# Patient Record
Sex: FEMALE | Race: WHITE
[De-identification: names, ages, dates, MRNs, and addresses within clinical notes are randomized per-mention and may not be internally consistent; named-entity substitution may affect disease eponyms.]

---

## 2018-07-19 ENCOUNTER — HOSPITAL ENCOUNTER (OUTPATIENT)
Dept: HOSPITAL 11 - JP.SDS | Age: 63
Discharge: HOME | End: 2018-07-19
Attending: SURGERY
Payer: MEDICARE

## 2018-07-19 VITALS — DIASTOLIC BLOOD PRESSURE: 91 MMHG | SYSTOLIC BLOOD PRESSURE: 151 MMHG

## 2018-07-19 DIAGNOSIS — F32.9: ICD-10-CM

## 2018-07-19 DIAGNOSIS — E78.70: Primary | ICD-10-CM

## 2018-07-19 DIAGNOSIS — Z98.84: ICD-10-CM

## 2018-07-19 DIAGNOSIS — Z88.1: ICD-10-CM

## 2018-07-19 DIAGNOSIS — Z88.2: ICD-10-CM

## 2018-07-19 PROCEDURE — 87081 CULTURE SCREEN ONLY: CPT

## 2018-07-19 PROCEDURE — 43239 EGD BIOPSY SINGLE/MULTIPLE: CPT

## 2018-07-23 NOTE — OR
DATE OF PROCEDURE:  07/19/2018

 

PREOPERATIVE DIAGNOSIS:  Bile reflux Francis limb and pouch suggestive of partial small bowel

obstruction.

 

POSTOPERATIVE DIAGNOSIS:  Bile reflux Francis limb and pouch suggestive of partial small bowel

obstruction.

 

PROCEDURE PERFORMED:  Upper GI endoscopy with biopsies of gastric pouch for CLOtest.

 

ANESTHESIA:  IV sedation.

 

INDICATION FOR PROCEDURE:  A 63-year-old presenting with long-term nausea status post Francis-

en-Y gastric bypass with some worsening epigastric discomfort as well as some reflux

symptoms.  The plan is to proceed with upper GI endoscopy with biopsies and/or dilation as

indicated.  Potential risks including bleeding and perforation were discussed, and the

patient wishes to proceed.

 

DETAILS OF PROCEDURE:  The patient was taken to the operating room and placed in a left

lateral decubitus position.  IV sedation was administered, after which the upper GI

endoscope was passed orally through the length of the esophagus into the gastric pouch and

from through the gastrojejunostomy roughly 20 cm into the Francis limb.

 

The findings included normal upper esophageal sphincter, and esophageal body.  If in the

distal esophagus and gastric pouch and Francis limb extending into Francis limb there's quite a

bit in the way of right in the way of retained bile. Bile that get more concentrated when

passed further down into the Francis limb.  There did not appear to be any evidence of

gastrogastric fistula.  There is no significant stricturing or significant inflammation seen

at any point during the course of the exam and would appear most of the symptoms are likely

related to the patient's ongoing bile reflux that latter likely representing symptoms of

partial small bowel obstruction.  Biopsy obtained from the gastric pouch, sent for CLOtest

for H. pylori.  Minimal bleeding from the biopsy sites were seen and the procedure then

concluded.

 

After discussion with family the patient will proceed with admission of the patient next

week for diagnostic laparoscopy, laparotomy if necessary, and examination of the small bowel

component of gastric bypass with revision and or lysis of adhesions as indicated to be done.

Her and her  are made aware that this may not necessarily resolve all of her

symptoms.  At this point, there appeared to be no other reasonable explanation particularly

given the bouts of the long-term nature of the nausea that she has been having.

 

 

 

 

Marcos Manzo MD

DD:  07/22/2018 11:39:39

DT:  07/23/2018 08:37:07

Job #:  [id number]

## 2021-02-01 NOTE — OR
DATE OF PROCEDURE:  01/26/2021

 

SURGEON:  Marcos Manzo MD

 

PREOPERATIVE DIAGNOSES:  Dysphagia and early satiety with postprandial abdominal pain.

 

POSTOPERATIVE DIAGNOSES:  Normal exam, status post esophagogastrectomy with Francis-en-Y

reconstruction.

 

OPERATIVE PROCEDURE:  Upper gastrointestinal endoscopy with dilation of esophagojejunostomy

(71105).

 

ANESTHESIA:  IV sedation.

 

INDICATIONS FOR PROCEDURE:  The patient is status post esophagogastrectomy as part of a

revisional procedure.  She presents now with some dysphagia and early satiety as well as

postprandial crampy abdominal pain.  Plan is to proceed with upper GI endoscopy with

biopsies and/or dilation as indicated.  Potential risks including bleeding and perforation

were discussed, and the patient wishes to proceed.

 

DETAILS OF PROCEDURE:  The patient was taken to the operating room and placed in a left

lateral decubitus position.  IV sedation was administered after which the upper GI endoscope

was passed orally through the length of the esophagus, into the area of the

esophagojejunostomy, and from there into the Francis limb up to 20 cm.  Overall, no significant

abnormalities were identified.  The anastomosis itself was really wide open.  There was no

retained food or fluid, had minimal if any inflammation in the area.  Just to ascertain

whether or not there might be some ability to dilate this, a 54-Kazakh balloon dilator was

deployed across the anastomosis, but this did not result in any significant increased

diameter.  The dilator was removed and the procedure then concluded.

 

Plan will be to proceed with an upper GI x-ray and small-bowel follow-through tomorrow to

further delineate the patient's functional anatomy at this time, and we will see her after

that exam has been completed.

 

 

 

 

Marcos Manzo MD

DD:  01/30/2021 17:56:40

DT:  02/01/2021 16:00:26

Job #:  [id number]

## 2021-02-05 NOTE — CR
[facility]

 

HISTORY: Postbariatric surgery

 

FINDINGS: Patient swallowed water-soluble contrast. Upright views of the abdomen

show no evidence of extravasation or obstruction. There is a surgical drain in

the left upper quadrant.

 

IMPRESSION: Status post bariatric surgery

 

No extravasation or obstruction seen

## 2021-02-07 NOTE — OR
DATE OF PROCEDURE:  02/04/2021

 

SURGEON:  Marcos Manzo MD

 

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

gastrogastric fistula.

 

POSTOPERATIVE DIAGNOSES:

1. Weight regain status post Francis-en-Y gastric bypass associated with gastrogastric

    fistula.

2. Partial small bowel obstruction at location of previous enteroenterostomy.

 

OPERATIVE PROCEDURES:

1. Exploratory laparotomy with lysis of adhesions with revision of Francis-en-Y gastric

    bypass (12993) including:

    a.     Closure of gastrogastric fistula.

    b.     Alteration of small bowel limb-lengths to facilitate increased weight loss.

2. Separate small bowel resection (42027).

 

ANESTHESIA:  General.

 

ASSISTANT:  Melissa Garcia PA-C

 

INDICATIONS FOR PROCEDURE:  This is a 65-year-old female presenting with longstanding morbid

obesity, recently had a significant weight regain after otherwise a fairly successful

gastric bypass.  Upper endoscopy revealed a relatively large gastric pouch.  Subsequent

upper GI x-ray showed a large gastrogastric fistula which was not evident at the time of the

upper endoscopy and the plan is to proceed with exploratory laparotomy and closure of

gastrogastric fistula to facilitate additional weight loss.  The patient will also have the

Francis limb distalized in order to create a smaller common limb to facilitate additional

weight loss.  Potential risks of the procedure including bleeding, infection, leaks from

various GI tract closures, problems with the distalization particularly in terms of possible

frequent loose bowel movements and other problems related to nutrition associated with

malabsorption were gone over along with the remote possibility of cardiopulmonary, septic,

or hemorrhagic complications leading to death, and the patient wishes to proceed.

 

DETAILS OF PROCEDURE:  The patient was taken to the operating room and after general

endotracheal anesthesia was induced, a Amezcua catheter was inserted, and the abdomen prepped

and draped.  An upper midline incision from the xiphoid above the umbilicus was made,

carried down through the full-thickness abdominal wall.  Upon entering the peritoneal

cavity, fairly extensive adhesions were encountered which were taken down, and at this

point, attention was taken to the area of the gastrogastric fistula.  Some adhesions between

the liver and the previous gastrojejunostomy and pouch formation were taken down.  An Gala

tube was then placed per Anesthesia orally across the gastric pouch and the Francis limb.  This

then allowed division of the stomach adjacent to the gastric pouch with a series of ALTHEA

staples.  Once the stomach attached to the gastric pouch had been divided from the otherwise

bypassed stomach, the Gala tube was used as a template to staple the attachments of that

portion of the bypassed stomach which would include the fistula again with a series of ALTHEA

staples and that gastric specimen was delivered from the field.  At that point, the entire

gastric pouch and area of gastrojejunostomy could be fully inserted along with the surgeon's

hand to assure that there was no remaining communication between that segment of the GI

tract and the bypassed portion of the stomach.

 

The small bowel was then evaluated.  The patient had a previous enteroenterostomy fairly

proximal along what was now the biliopancreatic limb.  Proximal to that, there was quite

marked dilation indicating partial obstruction at that location.  That segment of small

bowel was then resected with division proximally and distally with the ALTHEA stapler as was

the underlying mesentery.  A side-to-side enteroenterostomy reconstructing the GI tract

continuity was accomplished with an internal firing of the ALTHEA 60 mm stapler followed by

common opening being closed with the same stapler.  The angles were  anastomosed and

mesenteric defects were approximated with some 3-0 Vicryl stitch and attention then taken to

the Francis limb where the Francis limb attached to the more distal small bowel.  This was

detached.  A small segment of this was resected and was now measured out at 80 cm.  The

ileocecal valve was then identified and the small bowel then traced out 230 cm proximal to

that, thus giving the patient a total alimentary limb of 310 cm which in terms of length was

predominantly the common limb.  The same series of anastomosis was then accomplished as per

the previous small bowel anastomosis and in this case the mesenteric defect was approximated

with some 2-0 silk stitch to provide some permanency.  The biliopancreatic limb was measured

out at this point to be 260 cm which being somewhat long should help with the patient's

overall metabolic status over time.

 

The abdomen was irrigated with antibiotic-containing saline solution.  The various

anastomoses and closures of the GG fistula were then reinforced with fibrin sealant and a

single Arthur-Carter drain placed through a stab wound in the left subcostal area adjacent

to the area of the gastrogastric fistula takedown and from there up and along the splenic

fossa.  The midline fascia was then approximated with #2 Vicryl stitch, subcutaneous tissue

with 2 layers of 3-0 and 4-0 Vicryl stitch deep, and skin with staples.  Drains were affixed

with 4-0 Vicryl stitch.  Prior to closure, bilateral transversus abdominis plane blocks had

also been placed and the patient was taken to the recovery room in satisfactory condition.

 

Physician assistant, Melissa Garcia, played an essential role in assisting in this case,

helping to position the patient, retract structures as needed, as well as suturing and

cutting sutures when indicated.  Her presence improved patient safety and decreased the

operative time.

 

 

 

 

Marcos Manzo MD

DD:  02/07/2021 02:54:27

DT:  02/07/2021 09:46:43

Job #:  [id number]

## 2021-02-08 NOTE — DISCH
ADMISSION DIAGNOSES:  Gastrogastric fistula, weight regain, status post Francis-en-Y gastric

bypass surgery associated with gastrogastric fistula, and partial small bowel obstruction at

location of previous enteroenterostomy.

 

DISCHARGE DIAGNOSES:

1. Exploratory laparotomy with lysis of adhesions with revision of Francis-en-Y gastric

    bypass surgery including:

    a.     Closure of gastrogastric fistula.

    b.     Alteration of small bowel limb lengths to facilitate increased weight loss.

2. Separate small-bowel resection.

 

HISTORY:  Alma Rosa Espinosa is a pleasant 65-year-old female presenting with longstanding morbid

obesity.  Recently had a significant weight regain after maintaining weight for quite some

time.  An upper endoscopy revealed a large gastric pouch, and after preoperative evaluation,

discussion of possible risks and possible complications, she wished to proceed with surgical

procedure.

 

HOSPITAL COURSE:  Alma Rosa had her surgery on 02/04/2021.  Surgeon, Marcos Manzo MD.  She had

no operative complications.  On postoperative day #1, IV rate was decreased to 100 mL per

hour, Amezcua catheter was discontinued, and she was started on a step 2 gastric bypass diet.

On postoperative day #2, she was started on bowel stimulation and advanced to a step 3 diet.

On postoperative day 3, she was having loose stools, which did resolve themselves later in

the day, and on 02/08/2021, she was able to be discharged to home without any complications.

 

PHYSICAL EXAMINATION:

GENERAL:  Alma Rosa Espinosa is a pleasant 65-year-old female.

VITAL SIGNS:  Height is 5 feet 4 inches, weight is 220 pounds.  BMI 37.9.  TPR is 96.9, 58,

16.  Blood pressure 114/51.

HEENT:  Negative.

NECK:  Supple.

HEART:  Regular rate and rhythm.

LUNGS:  Clear.

ABDOMEN:  Incisions look good.  Abdominal binder is on.

EXTREMITIES:  Without peripheral edema.

 

DISPOSITION:  Discharged to home with home health care agency.

 

CONDITION:  Stable.

 

FOLLOWUP APPOINTMENT:  With Melissa Garcia PA-C, at [facility] on

02/12/2021 at 10:15 a.m.

 

HOME MEDICATIONS:  Celebrex 200 mg p.o. daily, #14; oxycodone 5 mg p.o. q.4 hours p.r.n.

pain, #42.  To resume all home medications.  Tylenol 1000 mg q.8 hours; multivitamin 1

chewable b.i.d.; vitamin B12 1000 mcg sublingual daily; calcium 600 mg p.o. daily; vitamin B

complex 1 daily; Zoloft 50 mg daily; cranberry 500 mg p.o. daily; vitamin D3 5000

international units p.o. daily; aspirin 81 mg p.o. daily; Trintellix 20 mg p.o. daily;

meclizine, Antivert 25 mg p.o. t.i.d. p.r.n.; Linzess 290 mcg p.o. daily; mirabegron,

Myrbetriq 50 mg p.o. daily; Zyrtec 10 mg p.o. daily; propranolol LA 80 mg p.o. daily; Zomig

5 mg p.o. p.r.n. headache; Maxalt 10 mg p.o. t.i.d. p.r.n.; Zofran ODT 4 mg q.8 hours p.r.n.

nausea; Pepcid 20 mg p.o. b.i.d.; clonazepam 0.5 mg p.o. b.i.d.; hydroxyzine 25 mg p.o.

t.i.d. p.r.n.; Flonase 2 sprays in each nostril daily; Neurontin 600 to 900 mg p.o. q.h.s

and Neurontin 300 mg p.o. q.a.m.; Flexeril 5 mg p.o. t.i.d. p.r.n.; Ultram 50 mg p.o. b.i.d.

p.r.n., do not take with oxycodone; and Rozerem 8 mg p.o. q.p.m.

 

DIET:  Step 3 gastric bypass diet until next appointment.

 

ACTIVITY:  No lifting greater than 10 pounds for 6 weeks.  Other activity:  Walk 6 times

daily inside your home.  Shower/bathing:  May shower.  Keep operative site clean and dry.

Wear abdominal binder for 6 weeks and then as tolerated.  Notify provider if any fever,

increased pain, swelling, redness, drainage, nausea, vomiting.

 

SPECIAL INSTRUCTION:  Use incentive spirometer 10 times every hour while awake for 1 week.

 

DD:  02/08/2021 07:03:41

DT:  02/08/2021 07:46:45

Job #:  [id number]

## 2021-11-25 NOTE — OR
DATE OF PROCEDURE:  11/23/2021

 

SURGEON:  Marcos Manzo MD

 

PREOPERATIVE DIAGNOSIS:  Increased hunger status post Francis-en-Y gastric bypass.

 

POSTOPERATIVE DIAGNOSIS:  Weight regain with increased hunger status post Francis-en-Y gastric

bypass with no abnormalities noted on upper GI endoscopy.

 

OPERATIVE PROCEDURE:  Upper GI endoscopy.

 

ANESTHESIA:  IV sedation.

 

INDICATIONS FOR PROCEDURE:  The patient is status post revision of a Francis-en-Y gastric

bypass.  The patient initially had felt a sense of early satiety, but this is now absent,

and the patient reports that she has been gaining some weight and feels hungry most of the

time.  She presently reports quite a bit in the way of grazing in terms of her eating

behavior and a significant amount of carbohydrate intake.  To rule out any anatomically

correctible problems, the patient is to undergo an upper GI endoscopy.  Potential risks

including bleeding and perforation were discussed, and the patient wishes to proceed.

 

DETAILS OF PROCEDURE:  The patient was taken to the operating room, placed in a left lateral

decubitus position.  IV sedation was administered, after which the upper GI endoscope was

passed orally through the length of the esophagus into the gastric pouch through the

gastrojejunostomy and roughly 20 cm into the Francis limb.

 

Overall, no abnormalities were noted.  No recurrent fistula was seen.  The patient had a

normally sized gastric pouch and gastrojejunostomy without significant inflammation.  Scope

was then withdrawn and the above findings reconfirmed and the procedure was then concluded.

 

The patient will be set up to see Melissa Garcia in 2 to 3 weeks.  The patient will likely

need some dietary counseling, and perhaps addition of a medication to decrease her appetite.

 

 

 

 

Marcos Manzo MD

DD:  11/25/2021 01:18:19

DT:  11/25/2021 11:07:00

Job #:  [id number]

## 2023-06-29 NOTE — PN
DATE OF SERVICE:  02/05/2021

 

SUBJECTIVE:  Alma Rosa is postoperative day #1.  Her pain has been controlled using a PCA.

Afebrile.  Oral intake 440.  Urine output via Amezcua catheter is 1800.  MADELIN drain put out 40

mL of a light red drainage.

 

REVIEW OF SYSTEMS:  Remainder of review of systems negative for any pertinent positives and

negatives.

 

OBJECTIVE:  GENERAL:  Alma Rosa Espinosa is a pleasant 65-year-old female.

VITAL SIGNS:  TPR is 97,4, 56, 18, and blood pressure 147/76.

HEENT:  Negative.

NECK:  Supple.

HEART:  Regular rate and rhythm.

LUNGS:  Clear.

ABDOMEN:  Dressing dry and intact.  Abdominal binder is on.

EXTREMITIES:  Without peripheral edema.

 

ASSESSMENT:  Exploratory laparotomy with lysis of adhesions with revision of the Francis-en-Y

gastric bypass, closure of gastrogastric fistula, and alter of the small bowel limb lengths

and separate small bowel resection.

 

POSTOPERATIVE DIAGNOSES:  Weight regain, status post Francis-en-Y gastrogastric fistula and

area of partial small bowel obstruction.  Date of procedure:  02/04/2021.

 

PLAN:

1. Discontinue Amezcua catheter.

2. Decrease IV to 100 mL per hour.

3. Step 2 gastric bypass diet without cereal.

4. May shower.

5. Discontinue telemetry.

6. Zofran 4 mg ODT every 4 hours p.r.n. nausea.

7. Referral made to home health care.  The patient is requesting home health care when

    discharged.

8. We will evaluate p.r.n. or in a.m.

 

 

 

 

Melissa Garcia PA-C

DD:  02/05/2021 07:31:30

DT:  02/05/2021 08:56:33

Job #:  [id number]
DATE OF SERVICE:  02/06/2021

 

The patient has been afebrile with stable vital signs, passing flatus.  No bowel movement as

of yet.  We will restart __________.  We will give her some additional MiraLax today, go

over to oral pain medication.  She may discontinue the MADELIN drain.  She may be ready for

discharge home tomorrow.

 

 

 

 

Marcos Manzo MD

DD:  02/06/2021 07:26:47

DT:  02/06/2021 08:39:21

Job #:  [id number]
DATE OF SERVICE:  02/07/2021

 

The patient has been afebrile with stable vital signs.  She did have some explosive diarrhea

overnight.  She was started back on her Linzess yesterday and we remain fine with that

current dose of 290 mcg daily even if it is too high.  We will put on it __________ until

that sorts out, then probably home tomorrow.

 

 

 

 

Marcos Manzo MD

DD:  02/07/2021 07:20:50

DT:  02/07/2021 12:46:47

Job #:  [id number]
Female